# Patient Record
Sex: FEMALE | Race: WHITE | NOT HISPANIC OR LATINO | URBAN - METROPOLITAN AREA
[De-identification: names, ages, dates, MRNs, and addresses within clinical notes are randomized per-mention and may not be internally consistent; named-entity substitution may affect disease eponyms.]

---

## 2023-09-09 ENCOUNTER — OFFICE VISIT (OUTPATIENT)
Dept: URGENT CARE | Facility: CLINIC | Age: 33
End: 2023-09-09
Payer: COMMERCIAL

## 2023-09-09 VITALS
TEMPERATURE: 98 F | HEIGHT: 64 IN | WEIGHT: 162 LBS | SYSTOLIC BLOOD PRESSURE: 119 MMHG | BODY MASS INDEX: 27.66 KG/M2 | HEART RATE: 77 BPM | DIASTOLIC BLOOD PRESSURE: 72 MMHG | OXYGEN SATURATION: 98 % | RESPIRATION RATE: 19 BRPM

## 2023-09-09 DIAGNOSIS — S00.10XA ECCHYMOSIS OF EYELID: ICD-10-CM

## 2023-09-09 DIAGNOSIS — S05.02XA ABRASION OF LEFT CORNEA, INITIAL ENCOUNTER: Primary | ICD-10-CM

## 2023-09-09 PROCEDURE — 99203 PR OFFICE/OUTPT VISIT, NEW, LEVL III, 30-44 MIN: ICD-10-PCS | Mod: S$GLB,,, | Performed by: FAMILY MEDICINE

## 2023-09-09 PROCEDURE — 99203 OFFICE O/P NEW LOW 30 MIN: CPT | Mod: S$GLB,,, | Performed by: FAMILY MEDICINE

## 2023-09-09 RX ORDER — POLYMYXIN B SULFATE AND TRIMETHOPRIM 1; 10000 MG/ML; [USP'U]/ML
1 SOLUTION OPHTHALMIC EVERY 6 HOURS
Qty: 10 ML | Refills: 0 | Status: SHIPPED | OUTPATIENT
Start: 2023-09-09 | End: 2023-09-16

## 2023-09-09 NOTE — PROGRESS NOTES
"Subjective:      Patient ID: Radha Priest is a 33 y.o. female.    Vitals:  height is 5' 4" (1.626 m) and weight is 73.5 kg (162 lb). Her oral temperature is 98.1 °F (36.7 °C). Her blood pressure is 119/72 and her pulse is 77. Her respiration is 19 and oxygen saturation is 98%.     Chief Complaint: Eye Pain (Left eye pains)    33 y.o. complains of eye pain which happened yesterday. She states that there was trauma to area and feels like something is in the eye. Pt states that she did an eye wash and it did not help.     Eye Pain   The left eye is affected. This is a new problem. The current episode started yesterday. The problem occurs constantly. The problem has been gradually worsening. The injury mechanism was a foreign body and a direct trauma. The pain is at a severity of 3/10. The pain is mild. There is No known exposure to pink eye. She Does not wear contacts. Associated symptoms include blurred vision, eye redness and a foreign body sensation. Pertinent negatives include no eye discharge, double vision, fever, itching, nausea, photophobia, recent URI or vomiting. She has tried commercial eye wash for the symptoms. The treatment provided no relief.       Constitution: Negative for fever.   Eyes:  Positive for eye pain, eye redness and blurred vision. Negative for eye discharge, eye itching, photophobia and double vision.   Gastrointestinal:  Negative for nausea and vomiting.      Objective:     Vitals:    09/09/23 1047   BP: 119/72   BP Location: Left arm   Patient Position: Sitting   BP Method: Medium (Automatic)   Pulse: 77   Resp: 19   Temp: 98.1 °F (36.7 °C)   TempSrc: Oral   SpO2: 98%   Weight: 73.5 kg (162 lb)   Height: 5' 4" (1.626 m)      Physical Exam   HENT:   Head:      Comments: Left eye lid bruising  Eyes: No visual field deficit is present. Right eye exhibits no hordeolum. No foreign body present in the right eye. Left eye exhibits no hordeolum. No foreign body present in the left eye. " Right conjunctiva is not injected. Left conjunctiva is injected. No scleral icterus. vision grossly intact gaze aligned appropriately      Comments: Increased fluorescein uptake to left cornea       Assessment:     1. Abrasion of left cornea, initial encounter    2. Ecchymosis of eyelid        Plan:       Abrasion of left cornea, initial encounter  -     Ambulatory referral/consult to Optometry  -     polymyxin B sulf-trimethoprim (POLYTRIM) 10,000 unit- 1 mg/mL Drop; Place 1 drop into the left eye every 6 (six) hours. May substitute with ofloxacin 0.3% opht 1 drop every 6 hrs to left eye X 7 days. for 7 days  Dispense: 10 mL; Refill: 0    2. Ecchymosis of eyelid  Manage conservatively. No hematoma.    Patient Instructions   I have placed optometry referral  Call to schedule an appointment: [phone]            Corneal Abrasion Discharge Instructions   About this topic   A corneal abrasion is a scrape, cut, or scratch on your cornea. The cornea is the clear part that covers your eye.  You can get a corneal abrasion if you accidentally scratch your cornea or if something gets stuck under your eyelid. Contact lenses can also cause a corneal abrasion. Most corneal abrasions heal within a few days.     What care is needed at home?   Ask your doctor what you need to do when you go home. Make sure you ask questions if you do not understand what the doctor says.  You may be more comfortable if you rest with your eyes closed. Bright lights may also cause eye pain. This is normal during the first day after the scratch.  Use your eye drops or eye ointments as ordered.  Do not press or rub your eyes.  Do not wear contact lenses until your eye has healed. Check with your regular eye doctor about when you can start to wear them again. After that, be sure to follow all instructions on proper care. This will help you avoid another corneal abrasion or serious infection.  Wash your hands before and after you touch your  eyes.  What follow-up care is needed?   It is important that your eye is checked as soon as possible to be sure the abrasion is healing. Make an appointment to see your eye doctor, ideally within 1 day. This is very important if your abrasion was caused by your contacts. If you were told your abrasion is minor and is not likely to affect your vision, it is OK to see your regular doctor.  What drugs may be needed?   The doctor may order drugs to:  Help with pain  Fight an infection  Moisten your eye  Will physical activity be limited?   You might have problems doing activities where you need to use your eyes.  What problems could happen?   Corneal scarring  Eye pain  Eyesight problems  What can be done to prevent this health problem?   Wear goggles when using tools that create dust and sand. Protect your eyes when playing sports.  Wear sunglasses when outside.  Be careful when using household .  If you wear contact lenses, clean them each day. Make sure there is no sand or dirt on them before you put them in.  Keep your nails short.  Use caution when playing with pets.  Cut back bushes or tree branches that might cause injury.  When do I need to call the doctor?   You have a change in your eyesight.  You have very bad eye pain.  You have ongoing pain in your eye or are bothered by bright lights after 1 to 2 days.  Your eye pain starts to get worse.  You have a fever of 100.4°F (38°C) or higher or chills.  You have signs of an eye infection like swelling, redness, warmth, pain, or drainage from the eye.  Teach Back: Helping You Understand   The Teach Back Method helps you understand the information we are giving you. After you talk with the staff, tell them in your own words what you learned. This helps to make sure the staff has described each thing clearly. It also helps to explain things that may have been confusing. Before going home, make sure you can do these:  I can tell you about my condition.  I can  tell you how to care for my eye.  I can tell you what I will do if I have eye drainage or trouble seeing.  Where can I learn more?   American Association [employer] Pediatric [employer]hthal[employer] and Strabismus  [email or web address]   FamilyDoctor.org  http://[email or web address]/en/prevention-wellness/staying-[email or web address]-aid/corneal-abrasions.html   Last Reviewed Date   2021-06-10  Consumer Information Use and Disclaimer   This information is not specific medical advice and does not replace information you receive from your health care provider. This is only a brief summary of general information. It does NOT include all information about conditions, illnesses, injuries, tests, procedures, treatments, therapies, discharge instructions or life-style choices that may apply to you. You must talk with your health care provider for complete information about your health and treatment options. This information should not be used to decide whether or not to accept your health care providers advice, instructions or recommendations. Only your health care provider has the knowledge and training to provide advice that is right for you.  Copyright   Copyright © 2021 UpToDate, Inc. and its affiliates and/or licensors. All rights reserved.        Taking Care of Bruises   The Basics   Written by the doctors and editors at [facility]   What are bruises? -- Bruises happen when blood vessels under the skin break, but the skin isn't cut. Blood leaks into the tissues under the skin. Bruises start off red in color, and then turn blue or purple. As they heal, bruises can turn green and yellow (figure 1). Most bruises heal in 1 to 2 weeks, but some take longer.  Bruises can happen when people get hurt, fall, or bump themselves. People usually have pain and swelling in the area of the bruise. Sometimes, the swelling happens right away.  Other times, the swelling starts 1 or 2 days later.  Some people bruise more easily and get worse bruises. These include people who have conditions that keep the blood from clotting normally and people who take medicines to prevent blood clots.  How are bruises treated? -- A bruise will get better on its own. But to feel better and help your bruise heal, you can:  Put a cold gel pack, bag of ice, or bag of frozen vegetables on the injured area every 1 to 2 hours, for 15 minutes each time. Put a thin towel between the ice (or other cold object) and your skin. Use the ice (or other cold object) for at least 6 hours after your injury. Some people find it helpful to ice longer, even up to 2 days after their injury.  Raise the area, if possible - Raising the area above the level of your heart helps to reduce swelling.  Take medicine to reduce the pain and swelling - To treat pain, you can take acetaminophen (sample brand name: Tylenol). To treat pain and swelling, you can take ibuprofen (sample brand names: Advil, Motrin). But people who have certain conditions or take certain medicines should not take ibuprofen. If you are unsure, ask your doctor or nurse if you can take ibuprofen.  You should not:  Put a warm pack or heating pad on your bruise  Stick a needle or other object in your bruise to drain it  When should I call the doctor or nurse? -- Call your doctor or nurse if:  You get a fever  Your bruise causes your joints to swell  You can't move or walk because of your bruise  You get bruises for no reason or have unusual bleeding, such as from your gums or in your urine  All topics are updated as new evidence becomes available and our peer review process is complete.  This topic retrieved from [employer] on: Sep 21, 2021.  Topic 29480 Version 7.0  Release: 29.4.2 - C29.263  © 2021 UpToDate, Inc. and/or its affiliates. All rights reserved.  figure 1: How bruises heal     This drawing shows how a bruise changes color as  it heals. A bruise starts off red in color (as shown in A) and then turns blue or purple (as shown in B). As a bruise heals, it can turn green and yellow (as shown in C).  Graphic 18268 Version 4.0     Consumer Information Use and Disclaimer   This information is not specific medical advice and does not replace information you receive from your health care provider. This is only a brief summary of general information. It does NOT include all information about conditions, illnesses, injuries, tests, procedures, treatments, therapies, discharge instructions or life-style choices that may apply to you. You must talk with your health care provider for complete information about your health and treatment options. This information should not be used to decide whether or not to accept your health care provider's advice, instructions or recommendations. Only your health care provider has the knowledge and training to provide advice that is right for you. The use of this information is governed by the [employer] End User License Agreement, available at https://www.Azaleos.UpTap/en/solutions/SoPost/about/alex.The use of [employer] content is governed by the [employer] Terms of Use. ©2021 UpToDate, Inc. All rights reserved.  Copyright   © 2021 UpToDate, Inc. and/or its affiliates. All rights reserved.